# Patient Record
Sex: FEMALE | Race: BLACK OR AFRICAN AMERICAN | NOT HISPANIC OR LATINO | Employment: UNEMPLOYED | URBAN - METROPOLITAN AREA
[De-identification: names, ages, dates, MRNs, and addresses within clinical notes are randomized per-mention and may not be internally consistent; named-entity substitution may affect disease eponyms.]

---

## 2023-05-23 ENCOUNTER — APPOINTMENT (OUTPATIENT)
Dept: ULTRASOUND IMAGING | Facility: CLINIC | Age: 43
End: 2023-05-23
Attending: EMERGENCY MEDICINE
Payer: COMMERCIAL

## 2023-05-23 ENCOUNTER — HOSPITAL ENCOUNTER (EMERGENCY)
Facility: CLINIC | Age: 43
Discharge: HOME | End: 2023-05-23
Attending: EMERGENCY MEDICINE | Admitting: EMERGENCY MEDICINE
Payer: COMMERCIAL

## 2023-05-23 ENCOUNTER — APPOINTMENT (OUTPATIENT)
Dept: CT IMAGING | Facility: CLINIC | Age: 43
End: 2023-05-23
Attending: EMERGENCY MEDICINE
Payer: COMMERCIAL

## 2023-05-23 VITALS
WEIGHT: 260 LBS | RESPIRATION RATE: 97 BRPM | HEART RATE: 72 BPM | OXYGEN SATURATION: 99 % | DIASTOLIC BLOOD PRESSURE: 102 MMHG | TEMPERATURE: 98.2 F | BODY MASS INDEX: 43.32 KG/M2 | SYSTOLIC BLOOD PRESSURE: 159 MMHG | HEIGHT: 65 IN

## 2023-05-23 DIAGNOSIS — T83.32XA MALPOSITIONED INTRAUTERINE DEVICE (IUD), INITIAL ENCOUNTER: ICD-10-CM

## 2023-05-23 DIAGNOSIS — N83.201 OVARIAN CYST, RIGHT: ICD-10-CM

## 2023-05-23 DIAGNOSIS — R14.0 BLOATING: ICD-10-CM

## 2023-05-23 DIAGNOSIS — R10.11 RUQ ABDOMINAL PAIN: ICD-10-CM

## 2023-05-23 LAB
ALBUMIN SERPL BCG-MCNC: 4.5 G/DL (ref 3.5–5.2)
ALBUMIN UR-MCNC: 30 MG/DL
ALP SERPL-CCNC: 71 U/L (ref 35–104)
ALT SERPL W P-5'-P-CCNC: 42 U/L (ref 10–35)
ANION GAP SERPL CALCULATED.3IONS-SCNC: 13 MMOL/L (ref 7–15)
APPEARANCE UR: CLEAR
AST SERPL W P-5'-P-CCNC: 39 U/L (ref 10–35)
ATRIAL RATE - MUSE: 64 BPM
BACTERIA #/AREA URNS HPF: ABNORMAL /HPF
BASOPHILS # BLD AUTO: 0 10E3/UL (ref 0–0.2)
BASOPHILS NFR BLD AUTO: 0 %
BILIRUB SERPL-MCNC: 0.4 MG/DL
BILIRUB UR QL STRIP: NEGATIVE
BUN SERPL-MCNC: 6.4 MG/DL (ref 6–20)
CALCIUM SERPL-MCNC: 9.5 MG/DL (ref 8.6–10)
CHLORIDE SERPL-SCNC: 102 MMOL/L (ref 98–107)
COLOR UR AUTO: YELLOW
CREAT SERPL-MCNC: 0.62 MG/DL (ref 0.51–0.95)
DEPRECATED HCO3 PLAS-SCNC: 25 MMOL/L (ref 22–29)
DIASTOLIC BLOOD PRESSURE - MUSE: NORMAL MMHG
EOSINOPHIL # BLD AUTO: 0.1 10E3/UL (ref 0–0.7)
EOSINOPHIL NFR BLD AUTO: 1 %
ERYTHROCYTE [DISTWIDTH] IN BLOOD BY AUTOMATED COUNT: 12.6 % (ref 10–15)
GFR SERPL CREATININE-BSD FRML MDRD: >90 ML/MIN/1.73M2
GLUCOSE SERPL-MCNC: 96 MG/DL (ref 70–99)
GLUCOSE UR STRIP-MCNC: NEGATIVE MG/DL
HCG UR QL: NEGATIVE
HCT VFR BLD AUTO: 43.5 % (ref 35–47)
HGB BLD-MCNC: 14.9 G/DL (ref 11.7–15.7)
HGB UR QL STRIP: ABNORMAL
IMM GRANULOCYTES # BLD: 0 10E3/UL
IMM GRANULOCYTES NFR BLD: 0 %
INTERPRETATION ECG - MUSE: NORMAL
KETONES UR STRIP-MCNC: 60 MG/DL
LEUKOCYTE ESTERASE UR QL STRIP: NEGATIVE
LIPASE SERPL-CCNC: 14 U/L (ref 13–60)
LYMPHOCYTES # BLD AUTO: 2.5 10E3/UL (ref 0.8–5.3)
LYMPHOCYTES NFR BLD AUTO: 26 %
MCH RBC QN AUTO: 32.3 PG (ref 26.5–33)
MCHC RBC AUTO-ENTMCNC: 34.3 G/DL (ref 31.5–36.5)
MCV RBC AUTO: 94 FL (ref 78–100)
MONOCYTES # BLD AUTO: 0.8 10E3/UL (ref 0–1.3)
MONOCYTES NFR BLD AUTO: 8 %
MUCOUS THREADS #/AREA URNS LPF: PRESENT /LPF
NEUTROPHILS # BLD AUTO: 6.4 10E3/UL (ref 1.6–8.3)
NEUTROPHILS NFR BLD AUTO: 65 %
NITRATE UR QL: NEGATIVE
NRBC # BLD AUTO: 0 10E3/UL
NRBC BLD AUTO-RTO: 0 /100
P AXIS - MUSE: 38 DEGREES
PH UR STRIP: 7 [PH] (ref 5–7)
PLATELET # BLD AUTO: 304 10E3/UL (ref 150–450)
POTASSIUM SERPL-SCNC: 3.6 MMOL/L (ref 3.4–5.3)
PR INTERVAL - MUSE: 162 MS
PROT SERPL-MCNC: 7.6 G/DL (ref 6.4–8.3)
QRS DURATION - MUSE: 100 MS
QT - MUSE: 426 MS
QTC - MUSE: 439 MS
R AXIS - MUSE: 15 DEGREES
RBC # BLD AUTO: 4.62 10E6/UL (ref 3.8–5.2)
RBC URINE: 30 /HPF
SODIUM SERPL-SCNC: 140 MMOL/L (ref 136–145)
SP GR UR STRIP: 1.02 (ref 1–1.03)
SQUAMOUS EPITHELIAL: 4 /HPF
SYSTOLIC BLOOD PRESSURE - MUSE: NORMAL MMHG
T AXIS - MUSE: 26 DEGREES
TROPONIN T SERPL HS-MCNC: <6 NG/L
UROBILINOGEN UR STRIP-MCNC: NORMAL MG/DL
VENTRICULAR RATE- MUSE: 64 BPM
WBC # BLD AUTO: 9.8 10E3/UL (ref 4–11)
WBC URINE: 6 /HPF

## 2023-05-23 PROCEDURE — 74177 CT ABD & PELVIS W/CONTRAST: CPT

## 2023-05-23 PROCEDURE — 93005 ELECTROCARDIOGRAM TRACING: CPT | Performed by: EMERGENCY MEDICINE

## 2023-05-23 PROCEDURE — 85014 HEMATOCRIT: CPT | Performed by: EMERGENCY MEDICINE

## 2023-05-23 PROCEDURE — 99285 EMERGENCY DEPT VISIT HI MDM: CPT | Mod: 25 | Performed by: EMERGENCY MEDICINE

## 2023-05-23 PROCEDURE — 81003 URINALYSIS AUTO W/O SCOPE: CPT | Performed by: EMERGENCY MEDICINE

## 2023-05-23 PROCEDURE — 80053 COMPREHEN METABOLIC PANEL: CPT | Performed by: EMERGENCY MEDICINE

## 2023-05-23 PROCEDURE — 84484 ASSAY OF TROPONIN QUANT: CPT | Performed by: EMERGENCY MEDICINE

## 2023-05-23 PROCEDURE — 93010 ELECTROCARDIOGRAM REPORT: CPT | Performed by: EMERGENCY MEDICINE

## 2023-05-23 PROCEDURE — 81025 URINE PREGNANCY TEST: CPT | Performed by: EMERGENCY MEDICINE

## 2023-05-23 PROCEDURE — 76856 US EXAM PELVIC COMPLETE: CPT

## 2023-05-23 PROCEDURE — 87086 URINE CULTURE/COLONY COUNT: CPT | Performed by: EMERGENCY MEDICINE

## 2023-05-23 PROCEDURE — 83690 ASSAY OF LIPASE: CPT | Performed by: EMERGENCY MEDICINE

## 2023-05-23 PROCEDURE — 250N000011 HC RX IP 250 OP 636: Performed by: EMERGENCY MEDICINE

## 2023-05-23 PROCEDURE — 36415 COLL VENOUS BLD VENIPUNCTURE: CPT | Performed by: EMERGENCY MEDICINE

## 2023-05-23 PROCEDURE — 76705 ECHO EXAM OF ABDOMEN: CPT

## 2023-05-23 PROCEDURE — 250N000009 HC RX 250: Performed by: EMERGENCY MEDICINE

## 2023-05-23 RX ORDER — IOPAMIDOL 755 MG/ML
100 INJECTION, SOLUTION INTRAVASCULAR ONCE
Status: COMPLETED | OUTPATIENT
Start: 2023-05-23 | End: 2023-05-23

## 2023-05-23 RX ORDER — LIDOCAINE HYDROCHLORIDE 20 MG/ML
10 SOLUTION OROPHARYNGEAL ONCE
Status: DISCONTINUED | OUTPATIENT
Start: 2023-05-23 | End: 2023-05-23

## 2023-05-23 RX ADMIN — SODIUM CHLORIDE 62 ML: 9 INJECTION, SOLUTION INTRAVENOUS at 14:47

## 2023-05-23 RX ADMIN — IOPAMIDOL 122 ML: 755 INJECTION, SOLUTION INTRAVENOUS at 14:33

## 2023-05-23 ASSESSMENT — ACTIVITIES OF DAILY LIVING (ADL)
ADLS_ACUITY_SCORE: 35

## 2023-05-23 NOTE — DISCHARGE INSTRUCTIONS
Your work-up revealed slightly elevated LFTs and fatty liver noted on CT scan.  Please follow-up with your primary care doctor for this on a nonemergent basis.    You also had a malpositioned IUD and a complex cyst on the right that will need follow-up.  OB/GYN is aware and they will arrange follow-up for both of these issues.  If you do not hear from them please contact them at the number below to ensure that you get follow-up. Please make an appointment to follow up with OB/Gyn - [facility] (phone: [phone]) as soon as possible.      If you develop any new or worsening symptoms please return to the ER.

## 2023-05-23 NOTE — ED TRIAGE NOTES
Patient reports abdominal distention for 5 days with abdominal pain. Patient reports the pain is radiating to the right shoulder blade. Patient reports chills and sweats. Patient reports what she has done at home has not been effective. Patient reports the abdominal pain is primarily in the right upper quadrant pain.

## 2023-05-23 NOTE — ED TRIAGE NOTES
Triage Assessment     Row Name 05/23/23 1133       Triage Assessment (Adult)    Airway WDL WDL       Respiratory WDL    Respiratory WDL WDL       Skin Circulation/Temperature WDL    Skin Circulation/Temperature WDL WDL       Cardiac WDL    Cardiac WDL WDL       Peripheral/Neurovascular WDL    Peripheral Neurovascular WDL WDL       Cognitive/Neuro/Behavioral WDL    Cognitive/Neuro/Behavioral WDL WDL

## 2023-05-23 NOTE — ED PROVIDER NOTES
[facility] EMERGENCY DEPARTMENT ([facility])    5/23/23      ED PROVIDER NOTE   ED 5 11:51 AM   History     Chief Complaint   Patient presents with     Abdominal Pain     Right upper quadrant pain primarily, radiating to the right shoulder. Patient reports severe bloat.      The history is provided by the patient and medical records.     Sarah Hernandez is a 42 year old female who presents with abdominal pain and bloating, right shoulder pain, greasy floaty loose stools, and nausea.  Symptoms of 2 weeks of with abdominal pain and distention.  She thought that maybe this is constipation, has been drinking water and taking magnesium supplements without improvement.  Her symptoms have worsened and her abdomen feels more distended.  Endorses fullness over her lower pelvic area as well, then states that she feels bloated all over her abdomen.  She endorses urinary urgency with bladder pressure, does not feel that she is emptying bladder.  She feels the urge to void but only has small voids.  She feels very nauseated with this, has had some cold sweats.  She states she has never had pain like this before.  She has no appetite.  As for fevers, she states that she typically has temperatures in the 97  F range, temperature today is higher at 98  F.  No history of cholecystitis, cholecystectomy, or cholelithiasis. She denies any recent alcohol use, states that she did drink during COVID quarantine but has not really drank much in the past year and has been working on losing weight.  Denies any vaginal symptoms. No heavy ibuprofen use. No black or bloody stools, no vomiting.  Hasn't been taking her thyroid medications for a year, asks if this could be contributing to symptoms. History of upper and lower endoscopy sometime in [id number] for evaluation of abdominal pain, was diagnosed with gastritis.      Past Medical History  No past medical history on file.  No past surgical history on file.  No current outpatient  "medications on file.    No Known Allergies  Family History  No family history on file.  Social History          A medically appropriate review of systems was performed with pertinent positives and negatives noted in the HPI, and all other systems negative.     Physical Exam   BP: (!) 109/90  Pulse: 85  Temp: 98.4  F (36.9  C)  Resp: 18  Height: 163.8 cm (5' 4.5\")  Weight: 117.9 kg (260 lb)  SpO2: 99 %      General: awake, alert, NAD  Head: normal cephalic  HEENT: pupils equal, conjugate gaze intact  Neck: Supple  CV: regular rate and rhythm without murmur  Lungs: clear to auscultation  Abd: soft, mild right upper quadrant and more so right lower quadrant tenderness on exam.  No guarding, no peritoneal signs.  Back: No right-sided flank tenderness.  EXT: lower extremities without swelling or edema  Neuro: awake, answers questions appropriately. No focal deficits noted       ED Course     Medications   iopamidol (ISOVUE-370) solution 100 mL (122 mLs Intravenous $Given 5/23/23 1433)   sodium chloride 0.9 % bag 100mL (62 mLs Intravenous $Given 5/23/23 1447)     Results for orders placed or performed during the hospital encounter of 05/23/23   CT Abdomen Pelvis w Contrast     Status: None    Narrative    CT ABDOMEN/PELVIS WITH CONTRAST May 23, 2023 2:57 PM    CLINICAL HISTORY: Right-sided abdominal pain.    TECHNIQUE: CT scan of the abdomen and pelvis was performed following  injection of IV contrast. Multiplanar reformats were obtained. Dose  reduction techniques were used.  CONTRAST: 122mL Isovue 370.    COMPARISON: None.    FINDINGS:   LOWER CHEST: The visualized lung bases are clear.    HEPATOBILIARY: Unremarkable. No hepatic masses are seen.    PANCREAS: Normal.    SPLEEN: Normal.    ADRENAL GLANDS: Normal.    KIDNEYS/BLADDER: Unremarkable. No hydronephrosis.    BOWEL: No bowel obstruction. No convincing evidence for colitis or  diverticulitis. Unremarkable appendix.    PELVIC ORGANS: A right ovarian cystic lesion " measures 3.9 cm. IUD is  positioned obliquely in the lower uterine segment. No free fluid in  the pelvis.    LYMPH NODES: No enlarged lymph nodes are identified in the abdomen or  pelvis.    VASCULATURE: Duplication of the IVC.    ADDITIONAL FINDINGS: None.    MUSCULOSKELETAL: Degenerative changes at the lumbosacral interspace.      Impression    IMPRESSION:   1.  A right ovarian cystic lesion measures 3.9 cm. Pelvic ultrasound  is recommended for further evaluation.  2.  Malpositioned intrauterine device is oriented obliquely in the  lower uterine segment. This may require repositioning, and gynecologic  consultation is recommended.    RENAN CLANCY MD         SYSTEM ID:  [id number]   Abdomen US, limited (RUQ only)     Status: None    Narrative    EXAM: US ABDOMEN LIMITED  LOCATION: [facility]  DATE/TIME: 5/23/2023 4:02 PM CDT    INDICATION: RUQ pain  COMPARISON: Today's CT AP 05/23/2023.  TECHNIQUE: Limited abdominal ultrasound.    FINDINGS:    GALLBLADDER: Normal. No gallstones, wall thickening, or pericholecystic fluid. Negative sonographic Nicole's sign.    BILE DUCTS: No biliary dilatation. The common duct measures 3 mm.    LIVER: Mild diffuse hepatic steatosis. No focal mass. Main portal vein patent with flow in proper direction.    RIGHT KIDNEY: No hydronephrosis.    PANCREAS: The visualized portions are normal.    No ascites.      Impression    IMPRESSION:  1.  Mild diffuse hepatic steatosis.  2.  Right upper quadrant abdominal ultrasound otherwise normal.       US Pelvis Cmplt w Transvag & Doppler LmtPel Duplex Limited     Status: None    Narrative    EXAM: US PELVIS COMPLETE W TRANSVAGINAL AND DOPPLER LIMITED  LOCATION: [facility]  DATE/TIME: 5/23/2023 4:18 PM CDT    INDICATION: RLQ pain, cyst on CT. R/O torsion.  COMPARISON: 05/23/2023 at 1416 hours   TECHNIQUE: Transabdominal scans were performed. Endovaginal  ultrasound was performed to better visualize the adnexa. Color flow with spectral Doppler and waveform analysis performed.      FINDINGS:  UTERUS: 8.7 x 5.1 x 3.6  cm. Normal in size and position with no masses.    ENDOMETRIUM: 6  mm. Normal smooth endometrium.    RIGHT OVARY: 4.6 x 4.9 x 4.1  cm. Normal arterial and venous duplex flow identified. A complex right ovarian cyst with a thin septation is 3.3 x 3.8 x 3.5 cm. There are 2 smaller simple cysts.    LEFT OVARY: 1.5 x 1.3 x 2.7 cm.  Normal arterial and venous duplex flow identified.    No significant free fluid.        Impression    IMPRESSION:    1.  No ovarian torsion.  2.  Simple and complex right ovarian cysts. Recommend 8-12 week follow-up.   Comprehensive metabolic panel     Status: Abnormal   Result Value Ref Range    Sodium 140 136 - 145 mmol/L    Potassium 3.6 3.4 - 5.3 mmol/L    Chloride 102 98 - 107 mmol/L    Carbon Dioxide (CO2) 25 22 - 29 mmol/L    Anion Gap 13 7 - 15 mmol/L    Urea Nitrogen 6.4 6.0 - 20.0 mg/dL    Creatinine 0.62 0.51 - 0.95 mg/dL    Calcium 9.5 8.6 - 10.0 mg/dL    Glucose 96 70 - 99 mg/dL    Alkaline Phosphatase 71 35 - 104 U/L    AST 39 (H) 10 - 35 U/L    ALT 42 (H) 10 - 35 U/L    Protein Total 7.6 6.4 - 8.3 g/dL    Albumin 4.5 3.5 - 5.2 g/dL    Bilirubin Total 0.4 <=1.2 mg/dL    GFR Estimate >90 >60 mL/min/1.73m2   Lipase     Status: Normal   Result Value Ref Range    Lipase 14 13 - 60 U/L   HCG qualitative urine     Status: Normal   Result Value Ref Range    hCG Urine Qualitative Negative Negative   UA with Microscopic reflex to Culture     Status: Abnormal    Specimen: Urine, Midstream   Result Value Ref Range    Color Urine Yellow Colorless, Straw, Light Yellow, Yellow    Appearance Urine Clear Clear    Glucose Urine Negative Negative mg/dL    Bilirubin Urine Negative Negative    Ketones Urine 60 (A) Negative mg/dL    Specific Gravity Urine 1.017 1.003 - 1.035    Blood Urine Moderate (A) Negative    pH Urine 7.0 5.0 - 7.0     Protein Albumin Urine 30 (A) Negative mg/dL    Urobilinogen Urine Normal Normal, 2.0 mg/dL    Nitrite Urine Negative Negative    Leukocyte Esterase Urine Negative Negative    Bacteria Urine Moderate (A) None Seen /HPF    Mucus Urine Present (A) None Seen /LPF    RBC Urine 30 (H) <=2 /HPF    WBC Urine 6 (H) <=5 /HPF    Squamous Epithelials Urine 4 (H) <=1 /HPF    Narrative    Urine Culture not indicated   Troponin T, High Sensitivity     Status: Normal   Result Value Ref Range    Troponin T, High Sensitivity <6 <=14 ng/L   CBC with platelets and differential     Status: None   Result Value Ref Range    WBC Count 9.8 4.0 - 11.0 10e3/uL    RBC Count 4.62 3.80 - 5.20 10e6/uL    Hemoglobin 14.9 11.7 - 15.7 g/dL    Hematocrit 43.5 35.0 - 47.0 %    MCV 94 78 - 100 fL    MCH 32.3 26.5 - 33.0 pg    MCHC 34.3 31.5 - 36.5 g/dL    RDW 12.6 10.0 - 15.0 %    Platelet Count 304 150 - 450 10e3/uL    % Neutrophils 65 %    % Lymphocytes 26 %    % Monocytes 8 %    % Eosinophils 1 %    % Basophils 0 %    % Immature Granulocytes 0 %    NRBCs per 100 WBC 0 <1 /100    Absolute Neutrophils 6.4 1.6 - 8.3 10e3/uL    Absolute Lymphocytes 2.5 0.8 - 5.3 10e3/uL    Absolute Monocytes 0.8 0.0 - 1.3 10e3/uL    Absolute Eosinophils 0.1 0.0 - 0.7 10e3/uL    Absolute Basophils 0.0 0.0 - 0.2 10e3/uL    Absolute Immature Granulocytes 0.0 <=0.4 10e3/uL    Absolute NRBCs 0.0 10e3/uL   EKG 12-lead, tracing only     Status: None   Result Value Ref Range    Systolic Blood Pressure  mmHg    Diastolic Blood Pressure  mmHg    Ventricular Rate 64 BPM    Atrial Rate 64 BPM    WI Interval 162 ms    QRS Duration 100 ms     ms    QTc 439 ms    P Axis 38 degrees    R AXIS 15 degrees    T Axis 26 degrees    Interpretation ECG       Sinus rhythm  Normal ECG    Confirmed by - EMERGENCY ROOM, PHYSICIAN ([id number]),  ALCIRA AGUIRRE ([id number]) on 5/23/2023 2:35:37 PM     CBC with platelets differential     Status: None    Narrative    The following orders were  created for panel order CBC with platelets differential.  Procedure                               Abnormality         Status                     ---------                               -----------         ------                     CBC with platelets and d...[[id number]]                      Final result                 Please view results for these tests on the individual orders.          Procedures            EKG Interpretation:      Interpreted by Chip Salgado MD   Time reviewed: 1221  Symptoms at time of EKG: Right shoulder pain  Rhythm: normal sinus   Rate: 64  Axis: normal  Ectopy: none  Conduction: normal  ST Segments/ T Waves: No ST-T wave changes  Q Waves: none  Comparison to prior: No old EKG available    Clinical Impression: No acute ischemia                       Critical care was not performed.     Medical Decision Making  The patient's presentation was of moderate complexity (an undiagnosed new problem with uncertain diagnosis).    The patient's evaluation involved:  review of external note(s) from 3+ sources (see separate area of note for details)  ordering and/or review of 3+ test(s) in this encounter (see separate area of note for details)  review of 3+ test result(s) ordered prior to this encounter (see separate area of note for details)  strong consideration of a test (see separate area of note for details) that was ultimately deferred  discussion of management or test interpretation with another health professional (see separate area of note for details)    The patient's management necessitated high risk (a decision regarding hospitalization).      Assessments & Plan (with Medical Decision Making)     Sarah Hernandez is a 42 year old female who presents with right upper quadrant pain rating to her back.  On exam she is well-appearing, nontoxic, normal vital signs. Does have tenderness in right lower quadrant in addition to right upper quadrant.  Differential would include things such as  cholecystitis, choledocholithiasis, pancreatitis could also consider appendicitis given tenderness on exam.  Would also consider renal pathology though less likely given lack of flank tenderness on exam.    Discussed plan for labwork, right upper quadrant ultrasound (especially if has abnormal LFTs and/or lipase), vs CT scan (if elevated white count). Patient and  agreeable to plan.     Laboratory studies notable for slight elevation of her LFTs but otherwise normal lipase, normal bilirubin, normal white count, no left shift.  Urine pregnancy test is negative ruling out ectopic as a potential cause of her symptoms.    CT scan showed a cyst on the right ovary but otherwise unremarkable including normal kidney, no hydroureter, no gallbladder pathology nor appendix pathology.  CT scan was also notable for a malpositioned IUD.    Given the abnormal LFTs and position of her pain I did obtain a right upper quadrant ultrasound which showed no evidence of gallstones or acute cholecystitis.  There was some hepatic steatosis which patient was made aware of in the setting of abnormal LFTs she should follow-up with her PCP for this.    Pelvic ultrasound was also performed given right-sided cyst with right-sided pain, no evidence of torsion.  There was a concern for complex cyst needing follow-up in 8 to 12 weeks.  Given the patient's bloating I did discuss the case with Gyn they recommended outpatient follow-up, they will schedule this.  They can also address the IUD at that time.  They did not recommend any further inpatient testing or recommendations.  Patient was also given the gynecology phone number should they not contact her to follow-up.  That she is aware of the importance of gynecology follow-up.    Given the otherwise negative work-up patient is discharged home with both PCP and gynecology follow-up.  She should return for new or worsening symptoms.    I have reviewed the nursing notes.    I have reviewed the  findings, diagnosis, plan and need for follow up with the patient.    New Prescriptions    No medications on file       Final diagnoses:   Ovarian cyst, right   Malpositioned intrauterine device (IUD), initial encounter   Bloating   RUQ abdominal pain       I, Kayce Monroy, am serving as a trained medical scribe to document services personally performed by Chip Tam MD based on the provider's statements to me on May 23, 2023.  This document has been checked and approved by the attending provider.    IChip MD, was physically present and have reviewed and verified the accuracy of this note documented by Kayce Monroy, medical scribe.      Chip Tam MD     5/23/2023   [facility] EMERGENCY DEPARTMENT         Chip Tam MD  05/23/23 1812       Chip Tam MD  05/23/23 1813

## 2023-05-25 LAB — BACTERIA UR CULT: NORMAL

## 2023-06-07 ENCOUNTER — OFFICE VISIT (OUTPATIENT)
Dept: OBGYN | Facility: CLINIC | Age: 43
End: 2023-06-07
Payer: COMMERCIAL

## 2023-06-07 VITALS
SYSTOLIC BLOOD PRESSURE: 157 MMHG | BODY MASS INDEX: 43.95 KG/M2 | DIASTOLIC BLOOD PRESSURE: 118 MMHG | WEIGHT: 263.8 LBS | HEIGHT: 65 IN | HEART RATE: 94 BPM

## 2023-06-07 DIAGNOSIS — E03.9 HYPOTHYROIDISM, UNSPECIFIED TYPE: Primary | ICD-10-CM

## 2023-06-07 DIAGNOSIS — Z12.4 CERVICAL CANCER SCREENING: ICD-10-CM

## 2023-06-07 DIAGNOSIS — Z30.432 ENCOUNTER FOR REMOVAL OF INTRAUTERINE CONTRACEPTIVE DEVICE: ICD-10-CM

## 2023-06-07 DIAGNOSIS — Z30.432 ENCOUNTER FOR REMOVAL OF INTRAUTERINE CONTRACEPTIVE DEVICE (IUD): ICD-10-CM

## 2023-06-07 PROCEDURE — G0145 SCR C/V CYTO,THINLAYER,RESCR: HCPCS | Performed by: STUDENT IN AN ORGANIZED HEALTH CARE EDUCATION/TRAINING PROGRAM

## 2023-06-07 PROCEDURE — 99203 OFFICE O/P NEW LOW 30 MIN: CPT | Mod: 25 | Performed by: OBSTETRICS & GYNECOLOGY

## 2023-06-07 PROCEDURE — 58301 REMOVE INTRAUTERINE DEVICE: CPT | Mod: GC | Performed by: OBSTETRICS & GYNECOLOGY

## 2023-06-07 PROCEDURE — 87624 HPV HI-RISK TYP POOLED RSLT: CPT | Performed by: STUDENT IN AN ORGANIZED HEALTH CARE EDUCATION/TRAINING PROGRAM

## 2023-06-07 PROCEDURE — 58301 REMOVE INTRAUTERINE DEVICE: CPT | Performed by: STUDENT IN AN ORGANIZED HEALTH CARE EDUCATION/TRAINING PROGRAM

## 2023-06-07 PROCEDURE — 99213 OFFICE O/P EST LOW 20 MIN: CPT | Mod: 25 | Performed by: STUDENT IN AN ORGANIZED HEALTH CARE EDUCATION/TRAINING PROGRAM

## 2023-06-07 NOTE — PROGRESS NOTES
[facility]  Gynecology Visit    Reason for Visit: ED follow up    HPI:    Sarah Hernandez is a 42 year old G0, here for ED follow up for a malpositioned IUD and ovarian cyst. She was seen in the ED on . US at that time demonstrated a 3.5cm R ov cyst, as well as an IUD in the lower uterine segment. Her mirena was placed about 1.5 years ago, and she would like it removed. She has monhly periods, 2d of heavy bleeding, then decreases. She does have cramping with her periods in her back and abdomen. OCPs did help her symptoms when she was in college. Denies any right or left sided abd pain. She has a history of hypothyroidism, but isn't currently on any mediations. Denies any other systemic symptoms including fever, chills, CP, SOB, N/V, swelling, etc.       GYN History  Length of menstrual cycle: every month  Duration of menses: 3-5 days  Heavy bleeding: first 2 days  Pain with menses: yes  LMP: 23  History of STIs: no  Pap Smears: due  History of abnormal paps: no  Contraception: IUD now    OBHx  OB History    Para Term  AB Living   0 0 0 0 0 0   SAB IAB Ectopic Multiple Live Births   0 0 0 0 0       Past Medical History:   Diagnosis Date     Hypothyroidism      PTSD (post-traumatic stress disorder)       Past Surgical History:   Procedure Laterality Date     COLONOSCOPY       ENDOSCOPY         No current outpatient medications on file.    No Known Allergies    No family history on file.    Social History     Socioeconomic History     Marital status:      Spouse name: Not on file     Number of children: Not on file     Years of education: Not on file     Highest education level: Not on file   Occupational History     Not on file   Tobacco Use     Smoking status: Not on file     Smokeless tobacco: Not on file   Vaping Use     Vaping status: Not on file   Substance and Sexual Activity     Alcohol use: Not on file     Drug use: Not on file     Sexual activity: Not on file   Other Topics  "Concern     Not on file   Social History Narrative    Works remotely for a non-profit     [employer]     Financial Resource Strain: Not on file   Food Insecurity: Not on file   Transportation Needs: Not on file   Physical Activity: Not on file   Stress: Not on file   Social Connections: Not on file   Intimate Partner Violence: Not on file   Housing Stability: Not on file       ROS: 10-Point ROS negative except as noted in HPI    Physical Exam  BP (!) 157/118 (BP Location: Right arm, Patient Position: Chair)   Pulse 94   Ht 1.638 m (5' 4.5\")   Wt 119.7 kg (263 lb 12.8 oz)   BMI 44.58 kg/m    Gen: Well-appearing, NAD  HEENT: Normocephalic, atraumatic  CV:  Regular rate, well perfused  Pulm: Breathing comfortably on room air  Abd: Soft, non-tender, non-distended  Ext: No LE edema, extremities warm and well perfused    Pelvic:  Normal appearing external female genitalia. Normal hair distribution. Vagina is without lesions. discharge. Cervix without lesions, no cervical motion tenderness. Uterus is small, mobile, non-tender. No adnexal tenderness or masses. IUD strings visible, grasped with ring forceps and removed intact.    Imaging:  CT ABDOMEN/PELVIS WITH CONTRAST May 23, 2023 2:57 PM  CLINICAL HISTORY: Right-sided abdominal pain.  TECHNIQUE: CT scan of the abdomen and pelvis was performed following  injection of IV contrast. Multiplanar reformats were obtained. Dose  reduction techniques were used.  CONTRAST: 122mL Isovue 370.     COMPARISON: None.     FINDINGS:   LOWER CHEST: The visualized lung bases are clear.     HEPATOBILIARY: Unremarkable. No hepatic masses are seen.     PANCREAS: Normal.     SPLEEN: Normal.     ADRENAL GLANDS: Normal.     KIDNEYS/BLADDER: Unremarkable. No hydronephrosis.     BOWEL: No bowel obstruction. No convincing evidence for colitis or  diverticulitis. Unremarkable appendix.     PELVIC ORGANS: A right ovarian cystic lesion measures 3.9 cm. IUD is  positioned " obliquely in the lower uterine segment. No free fluid in  the pelvis.     LYMPH NODES: No enlarged lymph nodes are identified in the abdomen or  pelvis.     VASCULATURE: Duplication of the IVC.     ADDITIONAL FINDINGS: None.     MUSCULOSKELETAL: Degenerative changes at the lumbosacral interspace.                                                                      IMPRESSION:   1.  A right ovarian cystic lesion measures 3.9 cm. Pelvic ultrasound  is recommended for further evaluation.  2.  Malpositioned intrauterine device is oriented obliquely in the  lower uterine segment. This may require repositioning, and gynecologic  consultation is recommended.    EXAM: US PELVIS COMPLETE W TRANSVAGINAL AND DOPPLER LIMITED  LOCATION: [facility]  DATE/TIME: 5/23/2023 4:18 PM CDT     INDICATION: RLQ pain, cyst on CT. R/O torsion.  COMPARISON: 05/23/2023 at 1416 hours   TECHNIQUE: Transabdominal scans were performed. Endovaginal ultrasound was performed to better visualize the adnexa. Color flow with spectral Doppler and waveform analysis performed.        FINDINGS:  UTERUS: 8.7 x 5.1 x 3.6  cm. Normal in size and position with no masses.     ENDOMETRIUM: 6  mm. Normal smooth endometrium.     RIGHT OVARY: 4.6 x 4.9 x 4.1  cm. Normal arterial and venous duplex flow identified. A complex right ovarian cyst with a thin septation is 3.3 x 3.8 x 3.5 cm. There are 2 smaller simple cysts.     LEFT OVARY: 1.5 x 1.3 x 2.7 cm.  Normal arterial and venous duplex flow identified.     No significant free fluid.                                                                         IMPRESSION:    1.  No ovarian torsion.  2.  Simple and complex right ovarian cysts. Recommend 8-12 week follow-up      Assessment/Plan:  Sarah Hernandez is a 42 year old G0 female here for ED follow up on malpositioned IUD and ovarian cysts    Ovarian cyst  US with 3.5cm cyst that appears simple with a small  septation. Discussed overall low concern with this cyst. She is asymptomatic. Discussed option for a repeat US in 8-12w if she would like. Reviewed return precautions including severe pain.     Malpositioned IUD  CT scan with IUD in lower uterine segment. The patient also does not like having and IUD, and so IUD removed intact. Discussed other methods of contraception, benefits of each, and that they can also help with bleeding or pain. She would like to think about this further.     Cervical cancer screening  - pap collected today    Health maintenance  Elevated BP  History of hypothyroidism  Patient desires establishing care with a PCP for discussion of her elevated BP, hypothyroidism, and for her primary care. Will place a referral today. Will order TSH in anticipation of this appointment per pt request.     Return to clinic in 1 year, sooner as needed    Staffed with Dr. Reinier Stephen MD  OB/GYN PGY-3  06/07/2023 2:54 PM    The Patient was seen in [facility] by CASTILLO STEPHEN.  I reviewed the history & exam. Assessment and plan were jointly made.  I supervised the IUD removal.     Susie Hills MD

## 2023-06-07 NOTE — PATIENT INSTRUCTIONS
Thank you for trusting us with your care!     If you need to contact us for questions about:  Symptoms, Scheduling & Medical Questions; Non-urgent (2-3 day response) Maia message, Urgent (needing response today) [phone] (if after 3:30pm next day response)   Prescriptions: Please call your Pharmacy   Billing: [place] [phone] or [facility]:[phone]

## 2023-06-12 LAB
BKR LAB AP GYN ADEQUACY: NORMAL
BKR LAB AP GYN INTERPRETATION: NORMAL
BKR LAB AP HPV REFLEX: NORMAL
BKR LAB AP PREVIOUS ABNORMAL: NORMAL
PATH REPORT.COMMENTS IMP SPEC: NORMAL
PATH REPORT.COMMENTS IMP SPEC: NORMAL
PATH REPORT.RELEVANT HX SPEC: NORMAL

## 2023-06-14 LAB
HUMAN PAPILLOMA VIRUS 16 DNA: NEGATIVE
HUMAN PAPILLOMA VIRUS 18 DNA: NEGATIVE
HUMAN PAPILLOMA VIRUS FINAL DIAGNOSIS: NORMAL
HUMAN PAPILLOMA VIRUS OTHER HR: NEGATIVE

## 2023-07-08 ENCOUNTER — MYC MEDICAL ADVICE (OUTPATIENT)
Dept: OBGYN | Facility: CLINIC | Age: 43
End: 2023-07-08
Payer: COMMERCIAL

## 2023-08-13 ENCOUNTER — HEALTH MAINTENANCE LETTER (OUTPATIENT)
Age: 43
End: 2023-08-13

## 2024-02-09 ENCOUNTER — MYC MEDICAL ADVICE (OUTPATIENT)
Dept: OBGYN | Facility: CLINIC | Age: 44
End: 2024-02-09
Payer: COMMERCIAL

## 2024-02-10 ENCOUNTER — HOSPITAL ENCOUNTER (EMERGENCY)
Facility: CLINIC | Age: 44
Discharge: HOME | End: 2024-02-10
Attending: EMERGENCY MEDICINE | Admitting: EMERGENCY MEDICINE
Payer: COMMERCIAL

## 2024-02-10 VITALS
HEART RATE: 76 BPM | TEMPERATURE: 97.5 F | DIASTOLIC BLOOD PRESSURE: 104 MMHG | SYSTOLIC BLOOD PRESSURE: 145 MMHG | OXYGEN SATURATION: 99 % | RESPIRATION RATE: 20 BRPM

## 2024-02-10 DIAGNOSIS — R51.9 NONINTRACTABLE HEADACHE, UNSPECIFIED CHRONICITY PATTERN, UNSPECIFIED HEADACHE TYPE: ICD-10-CM

## 2024-02-10 DIAGNOSIS — R00.2 PALPITATIONS: ICD-10-CM

## 2024-02-10 LAB
ANION GAP SERPL CALCULATED.3IONS-SCNC: 9 MMOL/L (ref 7–15)
ATRIAL RATE - MUSE: 66 BPM
BASOPHILS # BLD AUTO: 0 10E3/UL (ref 0–0.2)
BASOPHILS NFR BLD AUTO: 0 %
BUN SERPL-MCNC: 13.2 MG/DL (ref 6–20)
CALCIUM SERPL-MCNC: 8.8 MG/DL (ref 8.6–10)
CHLORIDE SERPL-SCNC: 101 MMOL/L (ref 98–107)
CREAT SERPL-MCNC: 0.64 MG/DL (ref 0.51–0.95)
DEPRECATED HCO3 PLAS-SCNC: 26 MMOL/L (ref 22–29)
DIASTOLIC BLOOD PRESSURE - MUSE: NORMAL MMHG
EGFRCR SERPLBLD CKD-EPI 2021: >90 ML/MIN/1.73M2
EOSINOPHIL # BLD AUTO: 0.2 10E3/UL (ref 0–0.7)
EOSINOPHIL NFR BLD AUTO: 2 %
ERYTHROCYTE [DISTWIDTH] IN BLOOD BY AUTOMATED COUNT: 12.9 % (ref 10–15)
GLUCOSE SERPL-MCNC: 122 MG/DL (ref 70–99)
HCT VFR BLD AUTO: 39.6 % (ref 35–47)
HGB BLD-MCNC: 13.5 G/DL (ref 11.7–15.7)
IMM GRANULOCYTES # BLD: 0 10E3/UL
IMM GRANULOCYTES NFR BLD: 0 %
INTERPRETATION ECG - MUSE: NORMAL
LYMPHOCYTES # BLD AUTO: 3.1 10E3/UL (ref 0.8–5.3)
LYMPHOCYTES NFR BLD AUTO: 35 %
MAGNESIUM SERPL-MCNC: 2 MG/DL (ref 1.7–2.3)
MCH RBC QN AUTO: 32 PG (ref 26.5–33)
MCHC RBC AUTO-ENTMCNC: 34.1 G/DL (ref 31.5–36.5)
MCV RBC AUTO: 94 FL (ref 78–100)
MONOCYTES # BLD AUTO: 0.8 10E3/UL (ref 0–1.3)
MONOCYTES NFR BLD AUTO: 9 %
NEUTROPHILS # BLD AUTO: 4.8 10E3/UL (ref 1.6–8.3)
NEUTROPHILS NFR BLD AUTO: 54 %
NRBC # BLD AUTO: 0 10E3/UL
NRBC BLD AUTO-RTO: 0 /100
P AXIS - MUSE: 23 DEGREES
PLATELET # BLD AUTO: 276 10E3/UL (ref 150–450)
POTASSIUM SERPL-SCNC: 3.5 MMOL/L (ref 3.4–5.3)
PR INTERVAL - MUSE: 156 MS
QRS DURATION - MUSE: 98 MS
QT - MUSE: 436 MS
QTC - MUSE: 457 MS
R AXIS - MUSE: 8 DEGREES
RBC # BLD AUTO: 4.22 10E6/UL (ref 3.8–5.2)
SODIUM SERPL-SCNC: 136 MMOL/L (ref 135–145)
SYSTOLIC BLOOD PRESSURE - MUSE: NORMAL MMHG
T AXIS - MUSE: 18 DEGREES
VENTRICULAR RATE- MUSE: 66 BPM
WBC # BLD AUTO: 9 10E3/UL (ref 4–11)

## 2024-02-10 PROCEDURE — 83735 ASSAY OF MAGNESIUM: CPT | Performed by: EMERGENCY MEDICINE

## 2024-02-10 PROCEDURE — 85025 COMPLETE CBC W/AUTO DIFF WBC: CPT | Performed by: EMERGENCY MEDICINE

## 2024-02-10 PROCEDURE — 36415 COLL VENOUS BLD VENIPUNCTURE: CPT | Performed by: EMERGENCY MEDICINE

## 2024-02-10 PROCEDURE — 250N000011 HC RX IP 250 OP 636: Mod: JZ | Performed by: EMERGENCY MEDICINE

## 2024-02-10 PROCEDURE — 96361 HYDRATE IV INFUSION ADD-ON: CPT

## 2024-02-10 PROCEDURE — 96375 TX/PRO/DX INJ NEW DRUG ADDON: CPT

## 2024-02-10 PROCEDURE — 96374 THER/PROPH/DIAG INJ IV PUSH: CPT

## 2024-02-10 PROCEDURE — 99284 EMERGENCY DEPT VISIT MOD MDM: CPT | Mod: 25

## 2024-02-10 PROCEDURE — 93005 ELECTROCARDIOGRAM TRACING: CPT

## 2024-02-10 PROCEDURE — 80048 BASIC METABOLIC PNL TOTAL CA: CPT | Performed by: EMERGENCY MEDICINE

## 2024-02-10 PROCEDURE — 258N000003 HC RX IP 258 OP 636: Performed by: EMERGENCY MEDICINE

## 2024-02-10 RX ORDER — DIPHENHYDRAMINE HYDROCHLORIDE 50 MG/ML
25 INJECTION INTRAMUSCULAR; INTRAVENOUS ONCE
Status: COMPLETED | OUTPATIENT
Start: 2024-02-10 | End: 2024-02-10

## 2024-02-10 RX ORDER — KETOROLAC TROMETHAMINE 15 MG/ML
15 INJECTION, SOLUTION INTRAMUSCULAR; INTRAVENOUS ONCE
Status: COMPLETED | OUTPATIENT
Start: 2024-02-10 | End: 2024-02-10

## 2024-02-10 RX ORDER — METOCLOPRAMIDE HYDROCHLORIDE 5 MG/ML
10 INJECTION INTRAMUSCULAR; INTRAVENOUS ONCE
Status: COMPLETED | OUTPATIENT
Start: 2024-02-10 | End: 2024-02-10

## 2024-02-10 RX ADMIN — METOCLOPRAMIDE 10 MG: 5 INJECTION, SOLUTION INTRAMUSCULAR; INTRAVENOUS at 07:03

## 2024-02-10 RX ADMIN — SODIUM CHLORIDE 1000 ML: 9 INJECTION, SOLUTION INTRAVENOUS at 05:28

## 2024-02-10 RX ADMIN — DIPHENHYDRAMINE HYDROCHLORIDE 25 MG: 50 INJECTION, SOLUTION INTRAMUSCULAR; INTRAVENOUS at 07:04

## 2024-02-10 RX ADMIN — KETOROLAC TROMETHAMINE 15 MG: 15 INJECTION, SOLUTION INTRAMUSCULAR; INTRAVENOUS at 07:03

## 2024-02-10 ASSESSMENT — ACTIVITIES OF DAILY LIVING (ADL): ADLS_ACUITY_SCORE: 35

## 2024-02-10 NOTE — DISCHARGE INSTRUCTIONS
Discharge Instructions  Headache    You were seen today for a headache. Headaches may be caused by many different things such as muscle tension, sinus inflammation, anxiety and stress, having too little sleep, too much alcohol, some medical conditions or injury. You may have a migraine, which is caused by changes in the blood vessels in your head.  At this time your provider does not find that your headache is a sign of anything dangerous or life-threatening.  However, sometimes the signs of serious illness do not show up right away.      Generally, every Emergency Department visit should have a follow-up clinic visit with either a primary or a specialty clinic/provider. Please follow-up as instructed by your emergency provider today.    Return to the Emergency Department if:  You get a new fever of 100.4 F or higher.  Your headache gets much worse.  You get a stiff neck with your headache.  You get a new headache that is significantly different or worse than headaches you have had before.  You are vomiting (throwing up) and cannot keep food or water down.  You have blurry or double vision or other problems with your eyes.  You have a new weakness on one side of your body.  You have difficulty with balance which is new.  You or your family thinks you are confused.  You have a seizure.    What can I do to help myself?  Pain medications - You may take a pain medication such as Tylenol  (acetaminophen), Advil , Motrin  (ibuprofen) or Aleve  (naproxen).  Take a pain reliever as soon as you notice symptoms.  Starting medications as soon as you start to have symptoms may lessen the amount of pain you have.  Relaxing in a quiet, dark room may help.  Get enough sleep and eat meals regularly.  You may need to watch for certain foods or other things which may trigger your headaches.  Keeping a journal of your headaches and possible triggers may help you and your primary provider to identify things which you should avoid which  may be causing your headaches.  If you were given a prescription for medicine here today, be sure to read all of the information (including the package insert) that comes with your prescription.  This will include important information about the medicine, its side effects, and any warnings that you need to know about.  The pharmacist who fills the prescription can provide more information and answer questions you may have about the medicine.  If you have questions or concerns that the pharmacist cannot address, please call or return to the Emergency Department.   Remember that you can always come back to the Emergency Department if you are not able to see your regular provider in the amount of time listed above, if you get any new symptoms, or if there is anything that worries you.    Discharge Instructions  Migraine    You were seen today for a headache that your provider thinks is likely a migraine. At this time your provider does not find that your headache is a sign of anything dangerous or life-threatening.  However, sometimes the signs of serious illness do not show up right away.      Generally, every Emergency Department visit should have a follow-up clinic visit with either a primary or a specialty clinic/provider. Please follow-up as instructed by your emergency provider today.    Return to the Emergency Department if:  You get a fever of 100.4 F or higher.  You get a stiff neck with your headache.  You get a new headache that is different or worse than headaches you have had before.  You are vomiting (throwing up) and cannot keep food or water down.  You have blurry or double vision or other problems with your eyes.  You have a new weakness on one side of your body.  You have difficulty with balance which is new.  You or your family thinks you are confused.  You have a seizure.    Treatment:  Often, treatment for your migraine will take some time to make you headache stop.  Going home to sleep can be very  effective.  Use your medications as directed; overuse of medications can actually cause headaches.  Once your headache has gone away, avoid triggers such as certain foods, skipping meals, bright lights, changes in sleep, exercise and stress.  Migraine headaches can have symptoms before the pain starts, like vision changes, funny smells/tastes, dizziness or other symptoms.  Treating a headache as soon as the first symptoms come on is very important and gives the best chance of stopping the headache.  If headaches are severe or frequent, you may need to start daily medication to prevent the headaches.  Carbon monoxide can cause headaches, so not burning things in your home is important.  Also get a carbon monoxide detector.  Some medications for migraines may raise your blood pressure, so use with caution if you have high blood pressure or heart problems.  If you were given a prescription for medicine here today, be sure to read all of the information (including the package insert) that comes with your prescription.  This will include important information about the medicine, its side effects, and any warnings that you need to know about.  The pharmacist who fills the prescription can provide more information and answer questions you may have about the medicine.  If you have questions or concerns that the pharmacist cannot address, please call or return to the Emergency Department.   Remember that you can always come back to the Emergency Department if you are not able to see your regular provider in the amount of time listed above, if you get any new symptoms, or if there is anything that worries you.    Discharge Instructions  Palpitations    Palpitations are an unusual awareness of your heartbeat. People often describe this as the heart skipping, fluttering, racing, irregular, or pounding. At this time, your provider has found no signs that your palpitations are due to a serious or life-threatening condition.  However, sometimes there is a serious problem that does not show up right away.    Palpitations can be caused by caffeine, cigarettes, diet pills, energy drinks or supplements, other stimulants, and medications and street drugs. They can also be caused by anxiety, hormone conditions such as high thyroid, and other medical conditions. Sometimes they are a sign of abnormal rhythm in the heart. At this time, your provider did not find any dangerous cause of your symptoms.    Generally, every Emergency Department visit should have a follow-up clinic visit with either a primary or a specialty clinic/provider. Please follow-up as instructed by your emergency provider today.    Return to the Emergency Department if:  You get chest pain or tightness.  You are short of breath.  You get very weak or tired.  You pass out or faint.  Your heart rate is over 120 beats per minute for more than 10 minutes while you are resting.   You have anything else that worries you.    What can I do to help myself?  Fill any prescriptions the provider gave you and take them right away.   Follow your provider s instructions about the prescription medicines you are on. Sometimes the provider may tell you to stop taking a medicine or change the dose.  If you smoke, this may be a good time to quit! The less you can smoke, the better.  Do not use energy drinks, diet pills, or stimulants. Limit your use of caffeine.  If you were given a prescription for medicine here today, be sure to read all of the information (including the package insert) that comes with your prescription.  This will include important information about the medicine, its side effects, and any warnings that you need to know about.  The pharmacist who fills the prescription can provide more information and answer questions you may have about the medicine.  If you have questions or concerns that the pharmacist cannot address, please call or return to the Emergency Department.      Remember that you can always come back to the Emergency Department if you are not able to see your regular provider in the amount of time listed above, if you get any new symptoms, or if there is anything that worries you.

## 2024-02-10 NOTE — ED TRIAGE NOTES
Pt had headache and palpitations with sweating NVD     Triage Assessment (Adult)       Row Name 02/10/24 0503          Triage Assessment    Airway WDL WDL        Respiratory WDL    Respiratory WDL WDL        Skin Circulation/Temperature WDL    Skin Circulation/Temperature WDL WDL        Cardiac WDL    Cardiac WDL rhythm        Cognitive/Neuro/Behavioral WDL    Cognitive/Neuro/Behavioral WDL WDL

## 2024-02-10 NOTE — ED PROVIDER NOTES
History     Chief Complaint:  Headache and Palpitations (Pt had headache and palpitations with sweating NVD)       HPI   Sarah Hernandez is a 43 year old female with a history of hypothyroidism who presents with severe headache for the past 2-3 days, accompanied by nausea and fluttering heart palpitations. She had trouble sleeping due to pain. Reports cold sweats. Her menstrual period began yesterday. Denies head trauma, vision changes, numbness, weakness, gait changes, and speech changes. No fever, chills, chest pain, shortness of breath, vomiting, and dysuria. She reports some diarrhea, which is not unusual for her. She reports history of migraines just after puberty, but not since. She notes she had her IUD removed in June, and an ovarian cyst was also discovered at that time. She reports having a milder version of today's symptoms in the 2-3 days before her last few menstrual periods. Patient reached out to her OB/GYN about this issue and is scheduled to see an endometriosis specialist March 4th.     Independent Historian:   Patient's  is at bedside and corroborates the above history. He reports her headache yesterday was the worst she has ever had.     Review of External Notes:   Reviewed yesterday's message exchange with OB/GYN.     Medications:    Levothyroxine   Wellbutrin    Past Medical History:    Hypothyroidism   PTSD   Depression     Physical Exam   Patient Vitals for the past 24 hrs:   BP Temp Temp src Pulse Resp SpO2   02/10/24 0738 (!) 145/104 -- -- 76 20 99 %   02/10/24 0500 (!) 162/92 -- -- 70 -- --   02/10/24 0439 (!) 179/113 97.5  F (36.4  C) Temporal 67 22 100 %        Physical Exam  Constitutional: Well appearing.  HEENT: Atraumatic.  PERRL.  EOMI.  Moist mucous membranes.  Neck: Soft.  Supple.   Cardiac: Regular rate and rhythm.  No murmur or rub.  Respiratory: Clear to auscultation bilaterally.  No respiratory distress.  No wheezing, rhonchi, or rales.  Abdomen: Soft and nontender.   No guarding.  Nondistended.  Musculoskeletal: No edema.  Normal range of motion.  Neurologic: Alert and oriented.  Normal tone and bulk.  No facial drooping.  Normal speech.  5/5 strength in bilateral upper and lower extremities.  Sensation to light touch intact throughout.  Normal gait.  Skin: No rashes.  No edema.  Psych: Normal affect.  Normal behavior.            Emergency Department Course   ECG:  ECG results from 02/10/24   EKG 12-lead, tracing only     Value    Systolic Blood Pressure     Diastolic Blood Pressure     Ventricular Rate 66    Atrial Rate 66    AK Interval 156    QRS Duration 98        QTc 457    P Axis 23    R AXIS 8    T Axis 18    Interpretation ECG      Sinus rhythm with Blocked Premature atrial complexes  Cannot rule out Anterior infarct , age undetermined  Abnormal ECG  When compared with ECG of 23-MAY-2023 12:21,  Premature atrial complexes are now Present  Read by me 0745       Laboratory:  Labs Ordered and Resulted from Time of ED Arrival to Time of ED Departure   BASIC METABOLIC PANEL - Abnormal       Result Value    Sodium 136      Potassium 3.5      Chloride 101      Carbon Dioxide (CO2) 26      Anion Gap 9      Urea Nitrogen 13.2      Creatinine 0.64      GFR Estimate >90      Calcium 8.8      Glucose 122 (*)    MAGNESIUM - Normal    Magnesium 2.0     CBC WITH PLATELETS AND DIFFERENTIAL    WBC Count 9.0      RBC Count 4.22      Hemoglobin 13.5      Hematocrit 39.6      MCV 94      MCH 32.0      MCHC 34.1      RDW 12.9      Platelet Count 276      % Neutrophils 54      % Lymphocytes 35      % Monocytes 9      % Eosinophils 2      % Basophils 0      % Immature Granulocytes 0      NRBCs per 100 WBC 0      Absolute Neutrophils 4.8      Absolute Lymphocytes 3.1      Absolute Monocytes 0.8      Absolute Eosinophils 0.2      Absolute Basophils 0.0      Absolute Immature Granulocytes 0.0      Absolute NRBCs 0.0        Emergency Department Course & Assessments:        Interventions:  Medications   sodium chloride 0.9% BOLUS 1,000 mL (0 mLs Intravenous Stopped 2/10/24 0651)   metoclopramide (REGLAN) injection 10 mg (10 mg Intravenous $Given 2/10/24 0703)   diphenhydrAMINE (BENADRYL) injection 25 mg (25 mg Intravenous $Given 2/10/24 0704)   ketorolac (TORADOL) injection 15 mg (15 mg Intravenous $Given 2/10/24 0703)        Independent Interpretation (X-rays, CTs, rhythm strip):  None    Assessments/Consultations/Discussion of Management or Tests:  ED Course as of 02/10/24 0744   Sat Feb 10, 2024   0649 I obtained the history and examined the patient as noted above.    0743 I rechecked and updated the patient. They were amenable to plan for discharge.        Social Determinants of Health affecting care:   None    Disposition:  The patient was discharged to home.     Impression & Plan    Medical Decision Making:  Sarah Hernandez is a 43-year-old woman who is afebrile and hemodynamically stable.  Lab workup as noted above and is unrevealing.  She has no fever or meningismus my concern for meningitis/encephalitis is exceedingly low.  She has no focal neurologic deficits on exam and I see no occasion for imaging of the head emergently.  She is given migraine cocktail with complete resolution of her symptoms and she is requesting discharge.  I think it is very reasonable at this time.  This seems to correlate worse with her menstrual cycle and I recommended follow-up with OB/GYN as well as a new prior care physician and neurologist or headache doctor and she is in agreement understanding.  I think she is safe for discharge home with reasonable clinical judgment.  We discussed supportive care at home and strict return precautions were given.  She is in agreement questions were answered.  She was in no distress at time of discharge.    Diagnosis:    ICD-10-CM    1. Nonintractable headache, unspecified chronicity pattern, unspecified headache type  R51.9       2. Palpitations  R00.2             Discharge Medications:  New Prescriptions    No medications on file      Scribe Disclosure:  I, Kellen Vladimir, am serving as a scribe at 6:26 AM on 2/10/2024 to document services personally performed by Kenton Kelly MD based on my observations and the provider's statements to me.     2/10/2024   Kenton Kelly MD Salay, Nicholas J, MD  02/11/24 0701

## 2024-03-10 ENCOUNTER — HEALTH MAINTENANCE LETTER (OUTPATIENT)
Age: 44
End: 2024-03-10

## 2024-10-06 ENCOUNTER — HEALTH MAINTENANCE LETTER (OUTPATIENT)
Age: 44
End: 2024-10-06